# Patient Record
Sex: MALE
[De-identification: names, ages, dates, MRNs, and addresses within clinical notes are randomized per-mention and may not be internally consistent; named-entity substitution may affect disease eponyms.]

---

## 2018-06-23 ENCOUNTER — HOSPITAL ENCOUNTER (EMERGENCY)
Dept: HOSPITAL 31 - C.ER | Age: 63
Discharge: HOME | End: 2018-06-23
Payer: SELF-PAY

## 2018-06-23 VITALS — OXYGEN SATURATION: 96 % | RESPIRATION RATE: 16 BRPM

## 2018-06-23 VITALS — HEART RATE: 82 BPM | SYSTOLIC BLOOD PRESSURE: 136 MMHG | TEMPERATURE: 98.2 F | DIASTOLIC BLOOD PRESSURE: 82 MMHG

## 2018-06-23 DIAGNOSIS — R05: Primary | ICD-10-CM

## 2018-06-23 PROCEDURE — 99284 EMERGENCY DEPT VISIT MOD MDM: CPT

## 2018-06-23 PROCEDURE — 71046 X-RAY EXAM CHEST 2 VIEWS: CPT

## 2018-06-23 PROCEDURE — 96372 THER/PROPH/DIAG INJ SC/IM: CPT

## 2018-06-23 NOTE — C.PDOC
History Of Present Illness


62 y/o M c PMHx smoking p/w cough x 2 weeks. Cough is dry, associated with L 

sided rib pain only when coughing. Denies hemoptysis or recent travel.


Time Seen by Provider: 06/23/18 21:54


Chief Complaint (Nursing): Shortness Of Breath





Past Medical History


Vital Signs: 


 Last Vital Signs











Temp  98.0 F   06/23/18 21:23


 


Pulse  80   06/23/18 21:23


 


Resp  16   06/23/18 21:37


 


BP  145/89   06/23/18 21:23


 


Pulse Ox  96   06/23/18 22:19














- Medical History


PMH: Anxiety


Family History: States: No Known Family Hx





- Social History


Hx Alcohol Use: No


Hx Substance Use: Yes





Review Of Systems


Except As Marked, All Systems Reviewed And Found Negative.


Cardiovascular: Negative for: Orthopnea


Gastrointestinal: Negative for: Vomiting





Physical Exam





- Physical Exam


Additional Physical Exam Comments: 


Constitutional: No acute distress.


Head: Normocephalic. Atraumatic.


Eyes: PERRL.


ENT: Moist mucous membranes.


Neck: Supple.


Cardiovascular: Regular rate. Radial pulse 2+ bilaterally.


Chest: Reproducible tenderness.


Respiratory: Clear to auscultation bilaterally.


GI: Soft. Nontender. Nondistended.


Back: No CVA tenderness.


Musculoskeletal: No tenderness or swelling of extremities.


Skin: No rash.


Neurologic: Alert, no focal deficit.





ED Course And Treatment


O2 Sat by Pulse Oximetry: 96





Medical Decision Making


Medical Decision Making: 


CXR to exclude pneumonia was negative. Cough of this duration typically 

allergies vs COPD vs GERD. Advised f/u with primary care. 





Disposition





- Disposition


Disposition: HOME/ ROUTINE


Disposition Time: 23:02


Condition: STABLE


Prescriptions: 


Benzonatate [Tessalon Perles] 200 mg PO TID #30 sgl


Instructions:  Cough in Adults


Forms:  [facility] (English)





- Clinical Impression


Clinical Impression: 


 Cough

## 2018-06-24 NOTE — RAD
HISTORY:



COMPARISON:

No prior.



TECHNIQUE:

Chest PA and lateral



FINDINGS:



LINES AND TUBES:

None. 



LUNG AND PLEURA:

The lungs are well inflated. There is peribronchial thickening and 

subsegmental atelectasis in the lung bases. No pleural effusion or 

pneumothorax. No focal consolidation. 



HEART AND MEDIASTINUM:

The heart is not enlarged. The hilar and mediastinal contours are 

within normal limits.



SKELETAL STRUCTURES:

The bony structures are within normal limits for the patient's age.



VISUALIZED UPPER ABDOMEN:

Normal.



OTHER FINDINGS:

None.



IMPRESSION:

Findings are most compatible with reactive small airway disease/ 

viral bronchitis. No lobar pneumonia.

## 2018-06-25 NOTE — CARD
--------------- APPROVED REPORT --------------





EKG Measurement

Heart [id number]

IN [id number]

[id number]

[id number]

[id number]



<Conclusion>

Normal sinus rhythm

Normal ECG